# Patient Record
Sex: FEMALE | URBAN - METROPOLITAN AREA
[De-identification: names, ages, dates, MRNs, and addresses within clinical notes are randomized per-mention and may not be internally consistent; named-entity substitution may affect disease eponyms.]

---

## 2020-05-06 ENCOUNTER — EMERGENCY (EMERGENCY)
Facility: HOSPITAL | Age: 40
LOS: 1 days | Discharge: HOME | End: 2020-05-06
Attending: EMERGENCY MEDICINE
Payer: COMMERCIAL

## 2020-05-06 VITALS — SYSTOLIC BLOOD PRESSURE: 111 MMHG | TEMPERATURE: 98 F | DIASTOLIC BLOOD PRESSURE: 75 MMHG

## 2020-05-06 VITALS
OXYGEN SATURATION: 99 % | SYSTOLIC BLOOD PRESSURE: 132 MMHG | WEIGHT: 199.96 LBS | HEART RATE: 83 BPM | DIASTOLIC BLOOD PRESSURE: 69 MMHG | TEMPERATURE: 99 F | RESPIRATION RATE: 16 BRPM | HEIGHT: 62 IN

## 2020-05-06 LAB
APPEARANCE UR: CLEAR — SIGNIFICANT CHANGE UP
BACTERIA # UR AUTO: ABNORMAL
BILIRUB UR-MCNC: NEGATIVE — SIGNIFICANT CHANGE UP
COLOR SPEC: YELLOW — SIGNIFICANT CHANGE UP
DIFF PNL FLD: NEGATIVE — SIGNIFICANT CHANGE UP
EPI CELLS # UR: SIGNIFICANT CHANGE UP
GLUCOSE UR QL: 1000 MG/DL
HCG UR QL: NEGATIVE — SIGNIFICANT CHANGE UP
KETONES UR-MCNC: NEGATIVE — SIGNIFICANT CHANGE UP
LEUKOCYTE ESTERASE UR-ACNC: ABNORMAL
NITRITE UR-MCNC: NEGATIVE — SIGNIFICANT CHANGE UP
PH UR: 7 — SIGNIFICANT CHANGE UP (ref 5–8)
PROT UR-MCNC: 15 MG/DL
RBC CASTS # UR COMP ASSIST: SIGNIFICANT CHANGE UP /HPF (ref 0–4)
SP GR SPEC: 1 — LOW (ref 1.01–1.02)
UROBILINOGEN FLD QL: NEGATIVE MG/DL — SIGNIFICANT CHANGE UP
WBC UR QL: SIGNIFICANT CHANGE UP

## 2020-05-06 PROCEDURE — 81001 URINALYSIS AUTO W/SCOPE: CPT

## 2020-05-06 PROCEDURE — T1013: CPT

## 2020-05-06 PROCEDURE — 99283 EMERGENCY DEPT VISIT LOW MDM: CPT

## 2020-05-06 PROCEDURE — 87086 URINE CULTURE/COLONY COUNT: CPT

## 2020-05-06 PROCEDURE — 82962 GLUCOSE BLOOD TEST: CPT

## 2020-05-06 PROCEDURE — 81025 URINE PREGNANCY TEST: CPT

## 2020-05-06 NOTE — ED PROVIDER NOTE - NSFOLLOWUPINSTRUCTIONS_ED_ALL_ED_FT
Hyperglycemia    Hyperglycemia occurs when the glucose (sugar) level in your blood is too high. Symptoms include increased urination, increased thirst, a dry mouth, or changes in appetite. If started on a medication, take exactly as prescribed by your health care professional. Maintain a healthy lifestyle and follow up with your primary care physician.    SEEK IMMEDIATE MEDICAL CARE IF YOU HAVE ANY OF THE FOLLOWING SYMPTOMS: shortness of breath, change in mental status, nausea or vomiting, fruity smell to your breath, or any signs of dehydration.   ---    CHRISTINE TODAS LAS INSTRUCCIONES ADJUNTAS PARA CORONAVIRUS INMEDIATAMENTE   - No vaya al trabajo/escuela/áreas públicas/transporte público.  - Autocuarentena oneida 14 días.  - Supervise marcus síntomas utilizando el rastreador de síntomas.  - Practicar el distanciamiento social y evitar el contacto con los demás. Evite compartir artículos personales para el hogar.   - Practicar la higiene adecuada de las elissa. Desinfectar superficies con frecuencia. Cúbrase la tos y estornude.   - Manténgase hidratado.      BUSCA ATENCIÓN MÉDICA INMEDIATA SI TIENES CUALQUIERA DE LOS SIGUIENTES SÍNTOMAS  **Busca atención médica inmediata si desarrollas nuevos/empeoramiento, signos/síntomas o preocupaciones, incluyendo, ernestina no limitado a dificultad para respirar, dificultad para respirar, dolor en el pecho, confusión, debilidad severa.**    Si julienne que está experimentando corinne emergencia médica llame al 9-1-1.    READ ALL ATTACHED INSTRUCTIONS FOR CORONAVIRUS IMMEDIATELY   - Do not go to work/school/public areas/public transit.  - Self quarantine for 14 days.  - Monitor your symptoms using symptom tracker.  - Practice social distancing and avoid contact with others. Avoid sharing personal household items.   - Practice proper hand hygiene. Disinfect surfaces frequently. Cover your coughs and sneezes.   - Stay hydrated.      SEEK IMMEDIATE MEDICAL CARE IF YOU HAVE ANY OF THE FOLLOWING SYMPTOMS  **Seek immediate medicate attention if you develop new/worsening, signs/symptoms or concerns including, but not limited to shortness of breath, difficulty breathing, chest pain, confusion, severe weakness.**    If you believe you are experiencing a medical emergency call 9-1-1.

## 2020-05-06 NOTE — ED PROVIDER NOTE - OBJECTIVE STATEMENT
38 y/o F with no PMHx presents to ED c/o fever (Tmax 100.2F), diffuse body aches, dry cough, sore throat and left sided back pain for past 2 days. Pt reports she took tylenol 2 tabs at 6pm with relief. Reports many people at her job have COVID 19. Sxs have not been limiting ADLs. Pt has been eating and drinking normally. Denies HA, neck pain, weakness, dizziness, LOC, CP, SOB, palpitations, NVD, abd pain, dysuria, hematuria, vaginal discharge

## 2020-05-06 NOTE — ED PROVIDER NOTE - PATIENT PORTAL LINK FT
You can access the FollowMyHealth Patient Portal offered by [facility] by registering at the following website: [email or web address]. By joining [facility]’s FollowMyHealth portal, you will also be able to view your health information using other applications (apps) compatible with our system.

## 2020-05-06 NOTE — ED PROVIDER NOTE - ATTENDING CONTRIBUTION TO CARE
Ewa: I performed a face to face bedside interview with patient regarding history of present illness, review of symptoms and past medical history. I completed an independent physical exam.  I have discussed patient's plan of care with advanced care provider.   I agree with note as stated above including HISTORY OF PRESENT ILLNESS, HIV, PAST MEDICAL/SURGICAL/FAMILY/SOCIAL HISTORY, ALLERGIES AND HOME MEDICATIONS, REVIEW OF SYSTEMS, PHYSICAL EXAM, MEDICAL DECISION MAKING and any PROGRESS NOTES during the time I functioned as the attending physician for this patient  unless otherwise noted. My brief assessment is as follows: pt with 2 days fever, sore throat, back pain. no other complaints, no sob, no abd pain, no meningeal symptoms. well apppearing, non toxic, ctab, rrr, nl pharynx, benign abd, no cvat. supportive care. return precautions

## 2020-05-06 NOTE — ED ADULT TRIAGE NOTE - CHIEF COMPLAINT QUOTE
'I have a fever for 2 days and sore throat and back pain and I have worse pain in lower back " Pt thinks she might have Corona virus last took Tylenol at 6pm

## 2020-05-06 NOTE — ED PROVIDER NOTE - CLINICAL SUMMARY MEDICAL DECISION MAKING FREE TEXT BOX
40 y/o F with no PMHx presents to ED c/o fever (Tmax 100.2F), diffuse body aches, dry cough, sore throat and left sided back pain for past 2 days.   -On exam well appearing, non toxic, lungs CTA, speaking full sentences, no hypoxia, no labored breathing while ambulating, L CVAT.   -Will check urine to eval infection  -Will follow up and re-evaluate patient

## 2020-05-06 NOTE — ED PROVIDER NOTE - CARE PLAN
Principal Discharge DX:	Fever  Secondary Diagnosis:	Body aches  Secondary Diagnosis:	Suspected 2019 novel coronavirus infection

## 2020-05-06 NOTE — ED PROVIDER NOTE - PROGRESS NOTE DETAILS
BONNIE Easley NOTE: UA without acute infection. Exam most consistent with viral illness, possibly COVID 19 due to current pandemic. Pt does not meet current COVID-19 hospital admission criteria.  -Lengthy discussion with patient regarding home quarantine, anticipatory guidance provided and supportive care recommended. Advised immediate return if worsening symptoms, strict return precautions, especially if short of breath or chest pain. Pt given [facility] Novel Coronavirus (COVID19) information packet in [place]. Pt verbalized understanding and agreement of plan. ED  Lashae Hyde.

## 2020-05-06 NOTE — ED PROVIDER NOTE - PHYSICAL EXAMINATION
Vital signs noted, see flowsheet.  General: NAD, well appearing and non-toxic.  HEENT: NC/AT. TM without erythema. MMM. No nasal discharge, throat without erythema or exudate. Conjunctiva and sclera clear b/l.  EOMI. PERRL.  Neck: Soft and supple, full ROM without pain. No meningeal signs  Cardiac: RRR. +S1/S2. Peripheral pulses 2+ and symmetric b/l. Capillary refill less than 2 seconds  Respiratory: Speaking in full sentences, no evidence of respiratory distress. Lungs CTA b/l, no wheezes/rhonchi/rales/stridor. No retractions or accessory muscle use. Ambulates without labored breathing  Abdomen: Normoactive BS x 4. Soft, NTND. No guarding or rebound tenderness, no suprapubic ttp  Back: Spine midline and non-tender. Minimal L CVAT, no R CVAT.  Skin: Normal color for race, no evidence of rash, ecchymosis, cyanosis or jaundice.   Neuro: Awake, alert and oriented to person/place/time/situation. Ambulatory with steady gait.

## 2020-05-08 LAB
CULTURE RESULTS: SIGNIFICANT CHANGE UP
SPECIMEN SOURCE: SIGNIFICANT CHANGE UP

## 2020-10-22 PROBLEM — Z78.9 OTHER SPECIFIED HEALTH STATUS: Chronic | Status: ACTIVE | Noted: 2020-05-06

## 2020-11-04 PROBLEM — Z00.00 ENCOUNTER FOR PREVENTIVE HEALTH EXAMINATION: Status: ACTIVE | Noted: 2020-11-04

## 2020-11-05 ENCOUNTER — APPOINTMENT (OUTPATIENT)
Dept: PULMONOLOGY | Facility: CLINIC | Age: 40
End: 2020-11-05
Payer: COMMERCIAL

## 2020-11-05 VITALS
HEIGHT: 59 IN | BODY MASS INDEX: 33.26 KG/M2 | SYSTOLIC BLOOD PRESSURE: 120 MMHG | RESPIRATION RATE: 16 BRPM | WEIGHT: 165 LBS | HEART RATE: 67 BPM | DIASTOLIC BLOOD PRESSURE: 70 MMHG | OXYGEN SATURATION: 97 %

## 2020-11-05 DIAGNOSIS — Z83.3 FAMILY HISTORY OF DIABETES MELLITUS: ICD-10-CM

## 2020-11-05 DIAGNOSIS — Z20.828 CONTACT WITH AND (SUSPECTED) EXPOSURE TO OTHER VIRAL COMMUNICABLE DISEASES: ICD-10-CM

## 2020-11-05 PROCEDURE — 99204 OFFICE O/P NEW MOD 45 MIN: CPT

## 2020-11-05 PROCEDURE — 99072 ADDL SUPL MATRL&STAF TM PHE: CPT

## 2020-11-05 RX ORDER — ALBUTEROL SULFATE 90 UG/1
108 (90 BASE) AEROSOL, METERED RESPIRATORY (INHALATION)
Refills: 0 | Status: ACTIVE | COMMUNITY
Start: 2020-11-05

## 2020-11-05 NOTE — CONSULT LETTER
[Dear  ___] : Dear  [unfilled], [Consult Letter:] : I had the pleasure of evaluating your patient, [unfilled]. [Please see my note below.] : Please see my note below. [Consult Closing:] : Thank you very much for allowing me to participate in the care of this patient.  If you have any questions, please do not hesitate to contact me. [Sincerely,] : Sincerely, [FreeTextEntry3] : Boogie Bertrand MD\par

## 2020-11-14 ENCOUNTER — OUTPATIENT (OUTPATIENT)
Dept: OUTPATIENT SERVICES | Facility: HOSPITAL | Age: 40
LOS: 1 days | End: 2020-11-14
Payer: COMMERCIAL

## 2020-11-14 ENCOUNTER — APPOINTMENT (OUTPATIENT)
Dept: CT IMAGING | Facility: CLINIC | Age: 40
End: 2020-11-14
Payer: COMMERCIAL

## 2020-11-14 DIAGNOSIS — Z20.828 CONTACT WITH AND (SUSPECTED) EXPOSURE TO OTHER VIRAL COMMUNICABLE DISEASES: ICD-10-CM

## 2020-11-14 PROCEDURE — 71275 CT ANGIOGRAPHY CHEST: CPT | Mod: 26

## 2020-11-14 PROCEDURE — 71275 CT ANGIOGRAPHY CHEST: CPT

## 2020-12-03 DIAGNOSIS — Z01.818 ENCOUNTER FOR OTHER PREPROCEDURAL EXAMINATION: ICD-10-CM

## 2020-12-05 ENCOUNTER — APPOINTMENT (OUTPATIENT)
Dept: DISASTER EMERGENCY | Facility: CLINIC | Age: 40
End: 2020-12-05

## 2020-12-06 LAB — SARS-COV-2 N GENE NPH QL NAA+PROBE: NOT DETECTED

## 2020-12-09 ENCOUNTER — APPOINTMENT (OUTPATIENT)
Dept: PULMONOLOGY | Facility: CLINIC | Age: 40
End: 2020-12-09
Payer: COMMERCIAL

## 2020-12-09 VITALS
RESPIRATION RATE: 14 BRPM | DIASTOLIC BLOOD PRESSURE: 68 MMHG | OXYGEN SATURATION: 98 % | HEART RATE: 72 BPM | SYSTOLIC BLOOD PRESSURE: 114 MMHG

## 2020-12-09 VITALS — WEIGHT: 169 LBS | BODY MASS INDEX: 34.53 KG/M2 | HEIGHT: 58.5 IN

## 2020-12-09 DIAGNOSIS — R06.02 SHORTNESS OF BREATH: ICD-10-CM

## 2020-12-09 DIAGNOSIS — U07.1 COVID-19: ICD-10-CM

## 2020-12-09 PROCEDURE — 85018 HEMOGLOBIN: CPT | Mod: QW

## 2020-12-09 PROCEDURE — 99214 OFFICE O/P EST MOD 30 MIN: CPT | Mod: 25

## 2020-12-09 PROCEDURE — 94729 DIFFUSING CAPACITY: CPT

## 2020-12-09 PROCEDURE — 99072 ADDL SUPL MATRL&STAF TM PHE: CPT

## 2020-12-09 PROCEDURE — 94727 GAS DIL/WSHOT DETER LNG VOL: CPT

## 2020-12-09 PROCEDURE — 94010 BREATHING CAPACITY TEST: CPT

## 2020-12-09 NOTE — REASON FOR VISIT
[Follow-Up] : a follow-up visit [Shortness of Breath] : shortness of breath [TextBox_44] : Post COVID

## 2020-12-09 NOTE — CONSULT LETTER
[Dear  ___] : Dear  [unfilled], [Courtesy Letter:] : I had the pleasure of seeing your patient, [unfilled], in my office today. [Please see my note below.] : Please see my note below. [Sincerely,] : Sincerely, [FreeTextEntry3] : Boogie Bertrand MD\par

## 2025-01-26 ENCOUNTER — OFFICE (OUTPATIENT)
Dept: URBAN - METROPOLITAN AREA CLINIC 94 | Facility: CLINIC | Age: 45
Setting detail: OPHTHALMOLOGY
End: 2025-01-26
Payer: MEDICAID

## 2025-01-26 DIAGNOSIS — H52.03: ICD-10-CM

## 2025-01-26 DIAGNOSIS — H11.153: ICD-10-CM

## 2025-01-26 DIAGNOSIS — E11.3293: ICD-10-CM

## 2025-01-26 PROCEDURE — 92015 DETERMINE REFRACTIVE STATE: CPT | Performed by: STUDENT IN AN ORGANIZED HEALTH CARE EDUCATION/TRAINING PROGRAM

## 2025-01-26 PROCEDURE — 92004 COMPRE OPH EXAM NEW PT 1/>: CPT | Performed by: STUDENT IN AN ORGANIZED HEALTH CARE EDUCATION/TRAINING PROGRAM

## 2025-01-26 ASSESSMENT — REFRACTION_MANIFEST
OD_VA1: 20/20
OU_VA: 20/20
OS_VA1: 20/20
OD_CYLINDER: -0.50
OS_VA2: 20/20
OS_CYLINDER: SPHERE
OS_AXIS: 000
OS_SPHERE: +0.50
OD_SPHERE: +1.00
OD_AXIS: 090
OD_VA2: 20/20

## 2025-01-26 ASSESSMENT — KERATOMETRY
OS_AXISANGLE_DEGREES: 085
OD_K2POWER_DIOPTERS: 42.75
OS_K1POWER_DIOPTERS: 41.25
OS_K2POWER_DIOPTERS: 43.00
METHOD_AUTO_MANUAL: AUTO
OD_AXISANGLE_DEGREES: 087
OD_K1POWER_DIOPTERS: 41.50

## 2025-01-26 ASSESSMENT — REFRACTION_AUTOREFRACTION
OS_CYLINDER: -0.25
OS_AXIS: 140
OD_AXIS: 091
OD_CYLINDER: -0.75
OS_SPHERE: -0.75
OD_SPHERE: -0.25

## 2025-01-26 ASSESSMENT — CONFRONTATIONAL VISUAL FIELD TEST (CVF)
OD_FINDINGS: FULL
OS_FINDINGS: FULL

## 2025-01-26 ASSESSMENT — TONOMETRY
OD_IOP_MMHG: 21
OS_IOP_MMHG: 20

## 2025-01-26 ASSESSMENT — VISUAL ACUITY
OD_BCVA: 20/20
OS_BCVA: 20/25